# Patient Record
Sex: MALE | Race: WHITE | NOT HISPANIC OR LATINO | URBAN - METROPOLITAN AREA
[De-identification: names, ages, dates, MRNs, and addresses within clinical notes are randomized per-mention and may not be internally consistent; named-entity substitution may affect disease eponyms.]

---

## 2018-04-26 ENCOUNTER — OFFICE (OUTPATIENT)
Dept: URBAN - METROPOLITAN AREA CLINIC 11 | Facility: CLINIC | Age: 48
End: 2018-04-26

## 2018-04-26 VITALS
HEART RATE: 68 BPM | HEIGHT: 73 IN | SYSTOLIC BLOOD PRESSURE: 138 MMHG | DIASTOLIC BLOOD PRESSURE: 80 MMHG | WEIGHT: 221 LBS

## 2018-04-26 DIAGNOSIS — R93.3 ABNORMAL FINDINGS ON DIAGNOSTIC IMAGING OF OTHER PARTS OF DI: ICD-10-CM

## 2018-04-26 PROCEDURE — 99202 OFFICE O/P NEW SF 15 MIN: CPT | Performed by: INTERNAL MEDICINE

## 2018-05-17 ENCOUNTER — AMBULATORY SURGICAL CENTER (OUTPATIENT)
Dept: URBAN - METROPOLITAN AREA SURGERY 3 | Facility: SURGERY | Age: 48
End: 2018-05-17

## 2018-05-17 ENCOUNTER — OFFICE (OUTPATIENT)
Dept: URBAN - METROPOLITAN AREA PATHOLOGY 22 | Facility: PATHOLOGY | Age: 48
End: 2018-05-17

## 2018-05-17 VITALS
SYSTOLIC BLOOD PRESSURE: 117 MMHG | WEIGHT: 215 LBS | DIASTOLIC BLOOD PRESSURE: 67 MMHG | HEART RATE: 80 BPM | SYSTOLIC BLOOD PRESSURE: 141 MMHG | TEMPERATURE: 97.5 F | DIASTOLIC BLOOD PRESSURE: 66 MMHG | HEART RATE: 86 BPM | HEART RATE: 80 BPM | DIASTOLIC BLOOD PRESSURE: 73 MMHG | DIASTOLIC BLOOD PRESSURE: 67 MMHG | TEMPERATURE: 98 F | DIASTOLIC BLOOD PRESSURE: 87 MMHG | HEART RATE: 87 BPM | DIASTOLIC BLOOD PRESSURE: 73 MMHG | HEART RATE: 84 BPM | DIASTOLIC BLOOD PRESSURE: 87 MMHG | HEIGHT: 73 IN | OXYGEN SATURATION: 95 % | SYSTOLIC BLOOD PRESSURE: 113 MMHG | RESPIRATION RATE: 19 BRPM | HEART RATE: 84 BPM | TEMPERATURE: 97.5 F | RESPIRATION RATE: 18 BRPM | OXYGEN SATURATION: 97 % | TEMPERATURE: 98 F | OXYGEN SATURATION: 98 % | RESPIRATION RATE: 18 BRPM | HEIGHT: 73 IN | OXYGEN SATURATION: 95 % | HEART RATE: 87 BPM | DIASTOLIC BLOOD PRESSURE: 66 MMHG | SYSTOLIC BLOOD PRESSURE: 117 MMHG | RESPIRATION RATE: 16 BRPM | WEIGHT: 215 LBS | HEART RATE: 79 BPM | OXYGEN SATURATION: 98 % | SYSTOLIC BLOOD PRESSURE: 113 MMHG | HEART RATE: 79 BPM | SYSTOLIC BLOOD PRESSURE: 112 MMHG | RESPIRATION RATE: 16 BRPM | SYSTOLIC BLOOD PRESSURE: 141 MMHG | HEART RATE: 86 BPM | RESPIRATION RATE: 19 BRPM | OXYGEN SATURATION: 97 % | SYSTOLIC BLOOD PRESSURE: 112 MMHG

## 2018-05-17 DIAGNOSIS — K63.5 POLYP OF COLON: ICD-10-CM

## 2018-05-17 DIAGNOSIS — R93.3 ABNORMAL FINDINGS ON DIAGNOSTIC IMAGING OF OTHER PARTS OF DI: ICD-10-CM

## 2018-05-17 DIAGNOSIS — K64.1 SECOND DEGREE HEMORRHOIDS: ICD-10-CM

## 2018-05-17 PROBLEM — D12.5 BENIGN NEOPLASM OF SIGMOID COLON: Status: ACTIVE | Noted: 2018-05-17

## 2018-05-17 PROCEDURE — 88305 TISSUE EXAM BY PATHOLOGIST: CPT | Performed by: INTERNAL MEDICINE

## 2018-05-17 PROCEDURE — 45380 COLONOSCOPY AND BIOPSY: CPT | Performed by: INTERNAL MEDICINE

## 2018-05-17 NOTE — SERVICEHPINOTES
47-year-old  man with no significant past medical history who presents as a follow-up from the emergency room at [facility] at the end of March, when he was seen for abdominal pain and diarrhea. The patient had a CT scan of his abdomen that revealed mild descending and sigmoid colitis, and he was discharged with a course of antibiotics (ciprofloxacin and metronidazole). Patient was advised to follow up with a gastroenterologist, and that is why he is presenting for evaluation. He reports that prior to the ER visit, he was having 5 days of abdominal pain in the mid abdomen along with nonbloody diarrhea. Since the ER visit, patient has not really had any abdominal pain, but he feels his stools have still not returned to their normal caliber. He has bowel movements 1 to 2 times a day, and feels the bowel movements are soft and they do have some shape, but are not fully formed. Patient has noted small traces of blood in his stool a couple of times very rarely, but not on any regular basis. No mucoid discharge noted. Patient does not notice any change or worsening of symptoms with dairy products. He has no family history of colon cancer, and no recent travel out of the country the last couple of years. Pt for colonoscopy for further evaluation.

## 2018-05-17 NOTE — SERVICEHPINOTES
47-year-old  man with no significant past medical history who presents as a follow-up from the emergency room at [facility] at the end of March, when he was seen for abdominal pain and diarrhea. The patient had a CT scan of his abdomen that revealed mild descending and sigmoid colitis, and he was discharged with a course of antibiotics (ciprofloxacin and metronidazole). Patient was advised to follow up with a gastroenterologist, and that is why he is presenting for evaluation. He reports that prior to the ER visit, he was having 5 days of abdominal pain in the mid abdomen along with nonbloody diarrhea. Since the ER visit, patient has not really had any abdominal pain, but he feels his stools have still not returned to their normal caliber. He has bowel movements 1 to 2 times a day, and feels the bowel movements are soft and they do have some shape, but are not fully formed. Patient has noted small traces of blood in his stool a couple of times very rarely, but not on any regular basis. No mucoid discharge noted. Patient does not notice any change or worsening of symptoms with dairy products. He has no family history of colon cancer, and no recent travel out of the country the last couple of years. Pt for colonoscopy for further evaluation.